# Patient Record
Sex: FEMALE | Race: WHITE | Employment: FULL TIME | URBAN - METROPOLITAN AREA
[De-identification: names, ages, dates, MRNs, and addresses within clinical notes are randomized per-mention and may not be internally consistent; named-entity substitution may affect disease eponyms.]

---

## 2019-08-12 ENCOUNTER — HOSPITAL ENCOUNTER (OUTPATIENT)
Age: 59
Setting detail: OUTPATIENT SURGERY
Discharge: HOME | End: 2019-08-12
Attending: OPHTHALMOLOGY | Admitting: OPHTHALMOLOGY
Payer: COMMERCIAL

## 2019-08-12 ENCOUNTER — ANESTHESIA (OUTPATIENT)
Dept: SURGERY | Age: 59
End: 2019-08-12
Payer: COMMERCIAL

## 2019-08-12 ENCOUNTER — ANESTHESIA EVENT (OUTPATIENT)
Dept: SURGERY | Age: 59
End: 2019-08-12
Payer: COMMERCIAL

## 2019-08-12 VITALS
TEMPERATURE: 97.6 F | WEIGHT: 220 LBS | OXYGEN SATURATION: 96 % | DIASTOLIC BLOOD PRESSURE: 85 MMHG | RESPIRATION RATE: 18 BRPM | HEART RATE: 83 BPM | HEIGHT: 65 IN | BODY MASS INDEX: 36.65 KG/M2 | SYSTOLIC BLOOD PRESSURE: 135 MMHG

## 2019-08-12 PROCEDURE — 74011250636 HC RX REV CODE- 250/636: Performed by: ANESTHESIOLOGY

## 2019-08-12 PROCEDURE — 76210000020 HC REC RM PH II FIRST 0.5 HR: Performed by: OPHTHALMOLOGY

## 2019-08-12 PROCEDURE — 77030017621 HC NDL OPHTH1 ALCN -B: Performed by: OPHTHALMOLOGY

## 2019-08-12 PROCEDURE — 77030026083 HC FCPS OPHTH GRSH DSP ALCN -C: Performed by: OPHTHALMOLOGY

## 2019-08-12 PROCEDURE — 77030038274 HC DEV VIEW OPTIC BIOM DISP SET OCLS -B: Performed by: OPHTHALMOLOGY

## 2019-08-12 PROCEDURE — 77030032623 HC KT VITRCMY TOT PLS ALCN -F: Performed by: OPHTHALMOLOGY

## 2019-08-12 PROCEDURE — 77030008547 HC TBNG OPHTH BD -A: Performed by: OPHTHALMOLOGY

## 2019-08-12 PROCEDURE — 77030032653 HC PRB DSP DIATHRMY DISP ALCN -B: Performed by: OPHTHALMOLOGY

## 2019-08-12 PROCEDURE — 74011000250 HC RX REV CODE- 250: Performed by: OPHTHALMOLOGY

## 2019-08-12 PROCEDURE — 77030018838 HC SOL IRR OPTH ALCN -B: Performed by: OPHTHALMOLOGY

## 2019-08-12 PROCEDURE — 74011000254 HC RX REV CODE- 254: Performed by: OPHTHALMOLOGY

## 2019-08-12 PROCEDURE — 77030038275 HC DEV VIEW LENS DISP OCLS -B: Performed by: OPHTHALMOLOGY

## 2019-08-12 PROCEDURE — 77030002975 HC SUT PLN J&J -B: Performed by: OPHTHALMOLOGY

## 2019-08-12 PROCEDURE — 77030018846 HC SOL IRR STRL H20 ICUM -A: Performed by: OPHTHALMOLOGY

## 2019-08-12 PROCEDURE — 76060000035 HC ANESTHESIA 2 TO 2.5 HR: Performed by: OPHTHALMOLOGY

## 2019-08-12 PROCEDURE — 77030032655 HC PRB LSR FLX ILLUM ALCON -C: Performed by: OPHTHALMOLOGY

## 2019-08-12 PROCEDURE — 74011250636 HC RX REV CODE- 250/636

## 2019-08-12 PROCEDURE — 74011250636 HC RX REV CODE- 250/636: Performed by: OPHTHALMOLOGY

## 2019-08-12 PROCEDURE — 76210000063 HC OR PH I REC FIRST 0.5 HR: Performed by: OPHTHALMOLOGY

## 2019-08-12 PROCEDURE — 76010000153 HC OR TIME 1.5 TO 2 HR: Performed by: OPHTHALMOLOGY

## 2019-08-12 RX ORDER — TROPICAMIDE 10 MG/ML
1 SOLUTION/ DROPS OPHTHALMIC
Status: COMPLETED | OUTPATIENT
Start: 2019-08-12 | End: 2019-08-12

## 2019-08-12 RX ORDER — CEFAZOLIN SODIUM 1 G/3ML
INJECTION, POWDER, FOR SOLUTION INTRAMUSCULAR; INTRAVENOUS AS NEEDED
Status: DISCONTINUED | OUTPATIENT
Start: 2019-08-12 | End: 2019-08-12 | Stop reason: HOSPADM

## 2019-08-12 RX ORDER — TETRACAINE HYDROCHLORIDE 5 MG/ML
SOLUTION OPHTHALMIC AS NEEDED
Status: DISCONTINUED | OUTPATIENT
Start: 2019-08-12 | End: 2019-08-12 | Stop reason: HOSPADM

## 2019-08-12 RX ORDER — ATROPINE SULFATE 10 MG/ML
1 SOLUTION/ DROPS OPHTHALMIC
Status: CANCELLED | OUTPATIENT
Start: 2019-08-12 | End: 2019-08-12

## 2019-08-12 RX ORDER — PHENYLEPHRINE HYDROCHLORIDE 25 MG/ML
1 SOLUTION/ DROPS OPHTHALMIC
Status: CANCELLED | OUTPATIENT
Start: 2019-08-12 | End: 2019-08-12

## 2019-08-12 RX ORDER — SODIUM CHLORIDE, SODIUM LACTATE, POTASSIUM CHLORIDE, CALCIUM CHLORIDE 600; 310; 30; 20 MG/100ML; MG/100ML; MG/100ML; MG/100ML
75 INJECTION, SOLUTION INTRAVENOUS CONTINUOUS
Status: DISCONTINUED | OUTPATIENT
Start: 2019-08-12 | End: 2019-08-12 | Stop reason: HOSPADM

## 2019-08-12 RX ORDER — MIDAZOLAM HYDROCHLORIDE 1 MG/ML
INJECTION, SOLUTION INTRAMUSCULAR; INTRAVENOUS AS NEEDED
Status: DISCONTINUED | OUTPATIENT
Start: 2019-08-12 | End: 2019-08-12 | Stop reason: HOSPADM

## 2019-08-12 RX ORDER — BETAMETHASONE SODIUM PHOSPHATE AND BETAMETHASONE ACETATE 3; 3 MG/ML; MG/ML
INJECTION, SUSPENSION INTRA-ARTICULAR; INTRALESIONAL; INTRAMUSCULAR; SOFT TISSUE AS NEEDED
Status: DISCONTINUED | OUTPATIENT
Start: 2019-08-12 | End: 2019-08-12 | Stop reason: HOSPADM

## 2019-08-12 RX ORDER — BUPIVACAINE HYDROCHLORIDE 7.5 MG/ML
INJECTION, SOLUTION EPIDURAL; RETROBULBAR AS NEEDED
Status: DISCONTINUED | OUTPATIENT
Start: 2019-08-12 | End: 2019-08-12 | Stop reason: HOSPADM

## 2019-08-12 RX ORDER — ATROPINE SULFATE 10 MG/ML
1 SOLUTION/ DROPS OPHTHALMIC
Status: COMPLETED | OUTPATIENT
Start: 2019-08-12 | End: 2019-08-12

## 2019-08-12 RX ORDER — LIDOCAINE HYDROCHLORIDE 20 MG/ML
INJECTION, SOLUTION INFILTRATION; PERINEURAL AS NEEDED
Status: DISCONTINUED | OUTPATIENT
Start: 2019-08-12 | End: 2019-08-12 | Stop reason: HOSPADM

## 2019-08-12 RX ORDER — MIDAZOLAM HYDROCHLORIDE 1 MG/ML
2 INJECTION, SOLUTION INTRAMUSCULAR; INTRAVENOUS ONCE
Status: DISCONTINUED | OUTPATIENT
Start: 2019-08-12 | End: 2019-08-12 | Stop reason: HOSPADM

## 2019-08-12 RX ORDER — TROPICAMIDE 10 MG/ML
1 SOLUTION/ DROPS OPHTHALMIC
Status: CANCELLED | OUTPATIENT
Start: 2019-08-12 | End: 2019-08-12

## 2019-08-12 RX ORDER — PHENYLEPHRINE HYDROCHLORIDE 25 MG/ML
1 SOLUTION/ DROPS OPHTHALMIC
Status: COMPLETED | OUTPATIENT
Start: 2019-08-12 | End: 2019-08-12

## 2019-08-12 RX ORDER — INDOCYANINE GREEN AND WATER 25 MG
KIT INJECTION AS NEEDED
Status: DISCONTINUED | OUTPATIENT
Start: 2019-08-12 | End: 2019-08-12 | Stop reason: HOSPADM

## 2019-08-12 RX ORDER — TRIAMCINOLONE ACETONIDE 40 MG/ML
INJECTION, SUSPENSION INTRA-ARTICULAR; INTRAMUSCULAR AS NEEDED
Status: DISCONTINUED | OUTPATIENT
Start: 2019-08-12 | End: 2019-08-12 | Stop reason: HOSPADM

## 2019-08-12 RX ADMIN — ATROPINE SULFATE 1 DROP: 10 SOLUTION/ DROPS OPHTHALMIC at 14:01

## 2019-08-12 RX ADMIN — SODIUM CHLORIDE, SODIUM LACTATE, POTASSIUM CHLORIDE, AND CALCIUM CHLORIDE: 600; 310; 30; 20 INJECTION, SOLUTION INTRAVENOUS at 18:21

## 2019-08-12 RX ADMIN — ATROPINE SULFATE 1 DROP: 10 SOLUTION/ DROPS OPHTHALMIC at 14:06

## 2019-08-12 RX ADMIN — PHENYLEPHRINE HYDROCHLORIDE 1 DROP: 25 SOLUTION/ DROPS OPHTHALMIC at 14:11

## 2019-08-12 RX ADMIN — TROPICAMIDE 1 DROP: 10 SOLUTION/ DROPS OPHTHALMIC at 14:11

## 2019-08-12 RX ADMIN — PHENYLEPHRINE HYDROCHLORIDE 1 DROP: 25 SOLUTION/ DROPS OPHTHALMIC at 14:06

## 2019-08-12 RX ADMIN — TROPICAMIDE 1 DROP: 10 SOLUTION/ DROPS OPHTHALMIC at 14:06

## 2019-08-12 RX ADMIN — SODIUM CHLORIDE, SODIUM LACTATE, POTASSIUM CHLORIDE, AND CALCIUM CHLORIDE 75 ML/HR: 600; 310; 30; 20 INJECTION, SOLUTION INTRAVENOUS at 14:01

## 2019-08-12 RX ADMIN — TROPICAMIDE 1 DROP: 10 SOLUTION/ DROPS OPHTHALMIC at 14:01

## 2019-08-12 RX ADMIN — PHENYLEPHRINE HYDROCHLORIDE 1 DROP: 25 SOLUTION/ DROPS OPHTHALMIC at 14:01

## 2019-08-12 RX ADMIN — MIDAZOLAM HYDROCHLORIDE 2 MG: 1 INJECTION, SOLUTION INTRAMUSCULAR; INTRAVENOUS at 17:14

## 2019-08-12 NOTE — DISCHARGE INSTRUCTIONS
[facility] [address] MD Teagan Foley MD Matthews Linger. MD Maral Torres. Melody Washington MD    [phone] or [phone] or [phone] or [phone]    Post Operative Instructions for Retina and Vitreous Surgery Patients    The following are a few guidelines that you should observe for two weeks after surgery:    1. Avoid stooping over, lifting heavy weights or bumping your head. 2.  Special positioning: left side down or upright    3. No extensive traveling except what is necessary. If a gas air bubble was put in your      eye at surgery - avoid air travel until you consult your doctor. 4.  You may shave after the third day. 5.  You may watch television, read, cook and wash dishes as long as it does not interfere        with special positioning instructions. 6.  Alcoholic beverages may be used in moderation. 7.  No sexual intercourse. 8.  You  may bathe the eyelids daily with cotton or a tissue moistened with warm tap       water. Do not bathe the eyeball itself. 9.  Some discharge from the operated eye is to be expected. This should gradually        improve. 10. Change the eye pad at least once daily. You may discontinue the eye pad whenever        comfortable to do so (usually three days after the operation). Wear the eye shield or        glasses at all times. 11. If you experience increasing pain, decreasing vision, or pus like discharge, call the        Office. 12. Medication Instructions:         Prednisolone 1% - one drop in the operated eye ___4___ times a day. Atropine 1% - one drop in the operated eye at bedtime. Tobradex Ointment - apply in operated eye 4 times a day as needed. _____ofloxain_(antibiotic drop) - one drop in operated eye 4 times a day.          BRING ALL EYE DROPS TO YOU POSTOPERATIVE APPOINTMENT! 13. Return appointment at the [facility]        On 8/13/19 at [address] or demonstrated understanding:  Verónica Fuller from Nurse    PATIENT INSTRUCTIONS:    After general anesthesia or intravenous sedation, for 24 hours or while taking prescription Narcotics:  · Limit your activities  · Do not drive and operate hazardous machinery  · Do not make important personal or business decisions  · Do  not drink alcoholic beverages  · If you have not urinated within 8 hours after discharge, please contact your surgeon on call. *  Please give a list of your current medications to your Primary Care Provider. *  Please update this list whenever your medications are discontinued, doses are      changed, or new medications (including over-the-counter products) are added. *  Please carry medication information at all times in case of emergency situations. These are general instructions for a healthy lifestyle:    No smoking/ No tobacco products/ Avoid exposure to second hand smoke    Surgeon General's Warning:  Quitting smoking now greatly reduces serious risk to your health. Obesity, smoking, and sedentary lifestyle greatly increases your risk for illness    A healthy diet, regular physical exercise & weight monitoring are important for maintaining a healthy lifestyle    You may be retaining fluid if you have a history of heart failure or if you experience any of the following symptoms:  Weight gain of 3 pounds or more overnight or 5 pounds in a week, increased swelling in our hands or feet or shortness of breath while lying flat in bed. Please call your doctor as soon as you notice any of these symptoms; do not wait until your next office visit.     Recognize signs and symptoms of STROKE:    F-face looks uneven    A-arms unable to move or move unevenly    S-speech slurred or non-existent    T-time-call 911 as soon as signs and symptoms begin-DO NOT go       Back to bed or wait to see if you get better-TIME IS BRAIN.

## 2019-08-12 NOTE — ANESTHESIA POSTPROCEDURE EVALUATION
Procedure(s):  VITRECTOMY POSTERIOR 25 GAUGE,LASER,  SCAR TISSUE REMOVAL, POSS. GAS FLUID EXCHANGE, RIGHT EYE.    general    Anesthesia Post Evaluation      Multimodal analgesia: multimodal analgesia used between 6 hours prior to anesthesia start to PACU discharge  Patient location during evaluation: bedside  Patient participation: complete - patient participated  Level of consciousness: awake and alert  Pain score: 3  Pain management: adequate  Airway patency: patent  Anesthetic complications: no  Cardiovascular status: acceptable and hemodynamically stable  Respiratory status: acceptable  Hydration status: acceptable  Post anesthesia nausea and vomiting:  none      Vitals Value Taken Time   /85 8/12/2019  7:25 PM   Temp 36.6 °C (97.8 °F) 8/12/2019  7:09 PM   Pulse 75 8/12/2019  7:28 PM   Resp 18 8/12/2019  7:28 PM   SpO2 96 % 8/12/2019  7:28 PM   Vitals shown include unvalidated device data.

## 2019-08-12 NOTE — ANESTHESIA PREPROCEDURE EVALUATION
Relevant Problems   No relevant active problems       Anesthetic History   No history of anesthetic complications            Review of Systems / Medical History  Patient summary reviewed and pertinent labs reviewed    Pulmonary  Within defined limits                 Neuro/Psych   Within defined limits           Cardiovascular  Within defined limits                Exercise tolerance: >4 METS     GI/Hepatic/Renal  Within defined limits              Endo/Other        Obesity     Other Findings              Physical Exam    Airway  Mallampati: II  TM Distance: > 6 cm  Neck ROM: normal range of motion   Mouth opening: Normal     Cardiovascular    Rhythm: regular  Rate: normal         Dental  No notable dental hx       Pulmonary  Breath sounds clear to auscultation               Abdominal         Other Findings            Anesthetic Plan    ASA: 2  Anesthesia type: total IV anesthesia            Anesthetic plan and risks discussed with: Patient and Spouse

## 2019-08-13 NOTE — ADDENDUM NOTE
Addendum  created 08/13/19 0217 by Analy Snyder MD    Attestation recorded in [facility] [place], [facility] 97 filed

## 2019-08-13 NOTE — OP NOTES
[address] [place] [address]  OPERATIVE REPORT    Name:  Rachel Velazquez  MR#:  [id number]  :  1960  ACCOUNT #:  [de-identified]  DATE OF SERVICE:  2019      SERVICE:  Retina. PREOPERATIVE DIAGNOSES:  1. Macular pucker, right eye. 2.  Rhegmatogenous retinal detachment, right eye. POSTOPERATIVE DIAGNOSES:  1. Macular pucker, right eye. 2.  Rhegmatogenous retinal detachment, right eye. PROCEDURE PERFORMED:  Pars plana vitrectomy with membrane stripping, fluid-air exchange, endolaser, and 24% SF6 gas tamponade, right eye. SURGEON:  Theo Jovel MD    ASSISTANT:  Wu Markham. ANESTHESIA:  Monitored anesthesia care with retrobulbar block. COMPLICATIONS:  None. SPECIMENS REMOVED:  None. IMPLANTS:  None. ESTIMATED BLOOD LOSS:  None. INDICATIONS:  This is a 80-year-old female who underwent scleral buckling approximately 6 weeks ago. She had a chronic-appearing retinal detachment. Immediately after the scleral buckle, she developed a dense epiretinal membrane over the macula. Over the weekend, she noted sudden changes in her vision. Upon examination, the macular pucker had worsened. There was also anterior subretinal fluid in the superior temporal quadrant. A decision was made to take her to the OR today for vitrectomy. We discussed the benefits, risks, alternatives and she elected to proceed with surgery. TECHNIQUE IN DETAIL:  The patient was brought to the operating room and placed in supine position. Retrobulbar anesthesia was administered using equal mixture of 2% lidocaine and 0.75% Marcaine. A total of 5 mL was injected. The right eye was prepped and draped in the usual sterile fashion. A 25-gauge infusion cannula was placed in the inferior temporal quadrant. The tip of the infusion line was visualized in the vitreous cavity and infusion was started. A trocar and cannula were placed in the 9:30 and 2:30 meridians in a similar fashion.   Under visualization of the BIOM, a core vitrectomy was performed. Kenalog was injected into the vitreous cavity to stain the posterior hyaloid. There was a posterior vitreous detachment present. The core vitrectomy was carried out. Dilute contrast medium was injected into the vitreous cavity, then irrigated after 60 seconds. Under visualization of macula lens, 25-gauge ILM forceps were used to engage a thick epiretinal membrane in the macula. The membrane peeled off the retina in one large sheet. The macula relaxed significantly as the membrane was removed. Dilute contrast medium was injected into the vitreous cavity again and irrigated after 30 seconds. There was no residual ILM left. There was a regular ILM outside the macula. The ILM was removed with the epiretinal membrane. Attention was turned to the detachment. There was a scleral buckle present. There was a cryo scar on the scleral buckle. The cryo scar was flat. There was fluid anterior to the scleral buckle. The peripheral gel was shaved with the aid of scleral depression. A complete fluid-air exchange was performed. There was some fluid anterior to the buckle. Diathermy was used to make a drainage retinotomy on the anterior crest of the buckle. A backflush needle was used to aspirate the subretinal fluid. Thick subretinal fluid was aspirated through the retinotomy. Endolaser was used to treat around the drainage retinotomy and scattered anteriorly through the area of detached retina. The vitreous cavity was dried again with a soft tip cannula. The instruments were withdrawn from the eye. The air was exchanged for 24% SF6 gas. The cannulas were removed and all three sclerotomies were closed with 6-0 plain gut suture. Erythromycin ointment was placed in the eye and the eye was patched and shielded. The patient was taken to the recovery room in stable condition.       Shira Pulliam MD [id number]  D: 08/12/2019 19:32  T:  08/12/2019 19:36  JOB #:  [id number]

## 2022-01-11 ENCOUNTER — APPOINTMENT (RX ONLY)
Dept: URBAN - METROPOLITAN AREA CLINIC 23 | Facility: CLINIC | Age: 62
Setting detail: DERMATOLOGY
End: 2022-01-11

## 2022-06-14 ENCOUNTER — APPOINTMENT (RX ONLY)
Dept: URBAN - METROPOLITAN AREA CLINIC 23 | Facility: CLINIC | Age: 62
Setting detail: DERMATOLOGY
End: 2022-06-14

## 2022-06-14 DIAGNOSIS — L81.4 OTHER MELANIN HYPERPIGMENTATION: ICD-10-CM

## 2022-06-14 DIAGNOSIS — D18.0 HEMANGIOMA: ICD-10-CM

## 2022-06-14 DIAGNOSIS — Z71.89 OTHER SPECIFIED COUNSELING: ICD-10-CM

## 2022-06-14 DIAGNOSIS — L57.8 OTHER SKIN CHANGES DUE TO CHRONIC EXPOSURE TO NONIONIZING RADIATION: ICD-10-CM

## 2022-06-14 DIAGNOSIS — L82.1 OTHER SEBORRHEIC KERATOSIS: ICD-10-CM

## 2022-06-14 DIAGNOSIS — L55.9 SUNBURN, UNSPECIFIED: ICD-10-CM

## 2022-06-14 PROBLEM — D18.01 HEMANGIOMA OF SKIN AND SUBCUTANEOUS TISSUE: Status: ACTIVE | Noted: 2022-06-14

## 2022-06-14 PROCEDURE — 99203 OFFICE O/P NEW LOW 30 MIN: CPT

## 2022-06-14 PROCEDURE — ? EDUCATIONAL RESOURCES PROVIDED

## 2022-06-14 PROCEDURE — ? COUNSELING

## 2022-06-14 ASSESSMENT — LOCATION DETAILED DESCRIPTION DERM
LOCATION DETAILED: LEFT ANTERIOR DISTAL THIGH
LOCATION DETAILED: LEFT POSTERIOR SHOULDER
LOCATION DETAILED: LEFT NASAL ROOT
LOCATION DETAILED: UPPER STERNUM
LOCATION DETAILED: LEFT MID-UPPER BACK
LOCATION DETAILED: RIGHT SUPERIOR FOREHEAD
LOCATION DETAILED: PERIUMBILICAL SKIN
LOCATION DETAILED: LEFT PROXIMAL POSTERIOR UPPER ARM
LOCATION DETAILED: LEFT PROXIMAL DORSAL FOREARM
LOCATION DETAILED: RIGHT PROXIMAL POSTERIOR UPPER ARM
LOCATION DETAILED: RIGHT POSTERIOR SHOULDER
LOCATION DETAILED: RIGHT POPLITEAL SKIN
LOCATION DETAILED: SUPERIOR THORACIC SPINE
LOCATION DETAILED: RIGHT PROXIMAL RADIAL DORSAL FOREARM

## 2022-06-14 ASSESSMENT — LOCATION SIMPLE DESCRIPTION DERM
LOCATION SIMPLE: LEFT UPPER BACK
LOCATION SIMPLE: NOSE
LOCATION SIMPLE: CHEST
LOCATION SIMPLE: LEFT THIGH
LOCATION SIMPLE: LEFT POSTERIOR UPPER ARM
LOCATION SIMPLE: RIGHT FOREHEAD
LOCATION SIMPLE: RIGHT POSTERIOR UPPER ARM
LOCATION SIMPLE: RIGHT FOREARM
LOCATION SIMPLE: RIGHT SHOULDER
LOCATION SIMPLE: UPPER BACK
LOCATION SIMPLE: ABDOMEN
LOCATION SIMPLE: RIGHT POPLITEAL SKIN
LOCATION SIMPLE: LEFT SHOULDER
LOCATION SIMPLE: LEFT FOREARM

## 2022-06-14 ASSESSMENT — LOCATION ZONE DERM
LOCATION ZONE: ARM
LOCATION ZONE: FACE
LOCATION ZONE: TRUNK
LOCATION ZONE: NOSE
LOCATION ZONE: LEG

## (undated) DEVICE — BIOM OPTIC SET DISPOSABLE, WITH BIOM HD PROFESSIONAL LENS FOR F=175 MM: Brand: BIOM OPTIC SET DISPOSABLE, WITH BIOM HD PROFESSIONAL LENS FOR F=175 MM

## (undated) DEVICE — CONSTELLATION VISION SYSTEM 5000 CPM ULTRAVIT PROBE STRAIGHT ENDOILLUMINATOR 25+ TOTALPLUS VITRECTOMY PAK EDGEPLUS BLADE VALVED ENTRY SYSTEM: Brand: CONSTELLATION, ULTRAVIT, 25+, TOTALPLUS, EDGEPLUS

## (undated) DEVICE — 25 GA ILLUMINATED FLEXIBLE CURVED LASER PROBE: Brand: ALCON, ENGAUGE

## (undated) DEVICE — SURGICAL PROCEDURE PACK EYE CDS

## (undated) DEVICE — SUTURE PLN GUT SZ 6-0 L18IN ABSRB YELLOWISH TAN L6.5MM 1735G

## (undated) DEVICE — Z DISCONTINUED NO SUB IDED SHIELD EYE PLAS RT BGE M 7.5CMX5.7CM VISITEC

## (undated) DEVICE — CARDINAL HEALTH FLEXIBLE LIGHT HANDLE COVER: Brand: CARDINAL HEALTH

## (undated) DEVICE — LENS VITRCTMY OCU FLAT DISP

## (undated) DEVICE — PAD,EYE,1-5/8X2 5/8,STERILE,LF,1/PK: Brand: MEDLINE

## (undated) DEVICE — COVER,MAYO STAND,STERILE: Brand: MEDLINE

## (undated) DEVICE — (D)STRIP SKN CLSR 0.5X4IN WHT --

## (undated) DEVICE — 1060 S-DRAPE SPCL INCISE 10/BX 4BX/CS: Brand: STERI-DRAPE™

## (undated) DEVICE — SOLUTION IRRIGATION BAL SALT SOLUTION 500 ML BTL 6/CA BSS +

## (undated) DEVICE — SOLUTION IV STRL H2O 500 ML AQUALITE POUR BTL

## (undated) DEVICE — ADVANCED DSP BACKFLUSH BLUNT, 25G: Brand: ALCON GRIESHABER

## (undated) DEVICE — SYR 10ML LUER LOK 1/5ML GRAD --

## (undated) DEVICE — DISPOSABLE BIPOLAR CODE, 12' (3.66 M): Brand: CONMED

## (undated) DEVICE — DRAPE TOWEL: Brand: CONVERTORS

## (undated) DEVICE — NEEDLE HYPO 25GA L1.5IN BLU POLYPR HUB S STL REG BVL STR

## (undated) DEVICE — IRRIGATION KT OPHTH 80IN --

## (undated) DEVICE — EYE SPEAR / FINE DISSECTOR: Brand: DEROYAL

## (undated) DEVICE — DIATHERMY PROBE DSP, 25G: Brand: ALCON GRIESHABER

## (undated) DEVICE — 25+® REVOLUTION DSP ILM FORCEPS: Brand: ALCON GRIESHABER REVOLUTION 25+